# Patient Record
Sex: FEMALE | Race: WHITE
[De-identification: names, ages, dates, MRNs, and addresses within clinical notes are randomized per-mention and may not be internally consistent; named-entity substitution may affect disease eponyms.]

---

## 2017-09-24 ENCOUNTER — HEALTH MAINTENANCE LETTER (OUTPATIENT)
Age: 57
End: 2017-09-24

## 2017-11-27 ENCOUNTER — OFFICE VISIT (OUTPATIENT)
Dept: URGENT CARE | Facility: URGENT CARE | Age: 57
End: 2017-11-27
Payer: COMMERCIAL

## 2017-11-27 VITALS — OXYGEN SATURATION: 97 % | WEIGHT: 99 LBS | HEART RATE: 80 BPM | TEMPERATURE: 98.3 F

## 2017-11-27 DIAGNOSIS — M54.42 ACUTE LEFT-SIDED LOW BACK PAIN WITH LEFT-SIDED SCIATICA: Primary | ICD-10-CM

## 2017-11-27 PROCEDURE — 99203 OFFICE O/P NEW LOW 30 MIN: CPT | Performed by: PHYSICIAN ASSISTANT

## 2017-11-27 RX ORDER — NAPROXEN 500 MG/1
500 TABLET ORAL 2 TIMES DAILY PRN
Qty: 30 TABLET | Refills: 1 | Status: SHIPPED | OUTPATIENT
Start: 2017-11-27

## 2017-11-27 RX ORDER — CYCLOBENZAPRINE HCL 10 MG
10 TABLET ORAL
Qty: 14 TABLET | Refills: 1 | Status: SHIPPED | OUTPATIENT
Start: 2017-11-27

## 2017-11-27 NOTE — NURSING NOTE
Chief Complaint   Patient presents with     Back Pain     Patient complains of back pain       Initial Pulse 80  Temp 98.3  F (36.8  C) (Oral)  Wt 99 lb (44.9 kg)  SpO2 97% There is no height or weight on file to calculate BMI.  Medication Reconciliation: complete         Bette Gonsalez

## 2017-11-27 NOTE — MR AVS SNAPSHOT
"              After Visit Summary   2017    Georgina Grider    MRN: [id number]           Patient Information     Date Of Birth          1960        Visit Information        Provider Department      2017 2:50 PM Dilma Maldonado PA-C [facility]        Today's Diagnoses     Acute left-sided low back pain with left-sided sciatica    -  1       Follow-ups after your visit        Who to contact     If you have questions or need follow up information about today's clinic visit or your schedule please contact [facility] directly at [phone].  Normal or non-critical lab and imaging results will be communicated to you by Ringlyhart, letter or phone within 4 business days after the clinic has received the results. If you do not hear from us within 7 days, please contact the clinic through Ringlyhart or phone. If you have a critical or abnormal lab result, we will notify you by phone as soon as possible.  Submit refill requests through Craftsvilla or call your pharmacy and they will forward the refill request to us. Please allow 3 business days for your refill to be completed.          Additional Information About Your Visit        MyChart Information     C[employer]svilla lets you send messages to your doctor, view your test results, renew your prescriptions, schedule appointments and more. To sign up, go to [email or web address] . Click on \"Log in\" on the left side of the screen, which will take you to the Welcome page. Then click on \"Sign up Now\" on the right side of the page.     You will be asked to enter the access code listed below, as well as some personal information. Please follow the directions to create your username and password.     Your access code is: [email or web address]  Expires: 2018  3:52 PM     Your access code will  in 90 days. If you need help or a new code, please call your [facility] or [phone].        Care EveryWhere ID     This is your Care " EveryWhere ID. This could be used by other organizations to access your [facility] medical records  [id number]        Your Vitals Were     Pulse Temperature Pulse Oximetry             80 98.3  F (36.8  C) (Oral) 97%          Blood Pressure from Last 3 Encounters:   No data found for BP    Weight from Last 3 Encounters:   11/27/17 99 lb (44.9 kg)              Today, you had the following     No orders found for display         Today's Medication Changes          These changes are accurate as of: 11/27/17  3:52 PM.  If you have any questions, ask your nurse or doctor.               Start taking these medicines.        Dose/Directions    cyclobenzaprine 10 MG tablet   Commonly known as:  FLEXERIL   Used for:  Acute left-sided low back pain with left-sided sciatica   Started by:  Dilma Maldonado PA-C        Dose:  10 mg   Take 1 tablet (10 mg) by mouth nightly as needed for muscle spasms   Quantity:  14 tablet   Refills:  1       naproxen 500 MG tablet   Commonly known as:  NAPROSYN   Used for:  Acute left-sided low back pain with left-sided sciatica   Started by:  Dilma Maldonado PA-C        Dose:  500 mg   Take 1 tablet (500 mg) by mouth 2 times daily as needed for moderate pain   Quantity:  30 tablet   Refills:  1            Where to get your medicines      These medications were sent to [facility] - [place], [place] - [address]  [address], [facility] [address]     Phone:  [phone]     cyclobenzaprine 10 MG tablet    naproxen 500 MG tablet                Primary Care Provider Office Phone # Fax #    Taz Juarez [phone] [address] [address] [facility] [address]        Equal Access to Services     CHRISSIE CHU : Hadii rex Elaine, waaxda luqadaha, qaybta kaalmada grant, lula anglin. So [facility] [phone].    ATENCIÓN: Si habla español, tiene a ayala disposición servicios gratuitos de asistencia  lingüística. Farhana al [phone].    We comply with applicable federal civil rights laws and [place] laws. We do not discriminate on the basis of race, color, national origin, age, disability, sex, sexual orientation, or gender identity.            Thank you!     Thank you for choosing [facility]  for your care. Our goal is always to provide you with excellent care. Hearing back from our patients is one way we can continue to improve our services. Please take a few minutes to complete the written survey that you may receive in the mail after your visit with us. Thank you!             Your Updated Medication List - Protect others around you: Learn how to safely use, store and throw away your medicines at www.disposemymeds.org.          This list is accurate as of: 11/27/17  3:52 PM.  Always use your most recent med list.                   Brand Name Dispense Instructions for use Diagnosis    cyclobenzaprine 10 MG tablet    FLEXERIL    14 tablet    Take 1 tablet (10 mg) by mouth nightly as needed for muscle spasms    Acute left-sided low back pain with left-sided sciatica       naproxen 500 MG tablet    NAPROSYN    30 tablet    Take 1 tablet (500 mg) by mouth 2 times daily as needed for moderate pain    Acute left-sided low back pain with left-sided sciatica

## 2017-11-27 NOTE — PROGRESS NOTES
SUBJECTIVE:   Georgina Grider is a 57 year old female who presents to clinic today for the following health issues:      Back Pain      Duration: 4 days    Description (location/character/radiation): low back    Intensity:  moderate    Accompanying signs and symptoms: low back pain, pain radiating down left side of leg    History (similar episodes/previous evaluation): None    Precipitating or alleviating factors: walking, sitting    Therapies tried and outcome: aleve, ibuprofen       Onset when she twisted while pulling jeans up. Tingling pain left lateral thigh, not past knee. No bowel/bladder trouble. No fever.          No Known Allergies    No past medical history on file.      No current outpatient prescriptions on file prior to visit.  No current facility-administered medications on file prior to visit.     No past surgical history on file.    Social History     Social History     Marital status: Single     Spouse name: N/A     Number of children: N/A     Years of education: N/A     Occupational History     Not on file.     Social History Main Topics     Smoking status: Never Smoker     Smokeless tobacco: Never Used     Alcohol use Not on file     Drug use: Not on file     Sexual activity: Not on file     Other Topics Concern     Not on file     Social History Narrative     No narrative on file       REVIEW OF SYSTEMS  General: negative for fever  Resp: negative for chest pain   CV: negative for chest pain  : negative for dysuria , incontinence, frequency  Musculoskeletal: as above  Neurologic: negative for ataxia, saddle anesthesia, fecal incontinence, one sided weakness,  paresthesias    Physical Exam:  Vitals: Pulse 80  Temp 98.3  F (36.8  C) (Oral)  Wt 99 lb (44.9 kg)  SpO2 97%  BMI= There is no height or weight on file to calculate BMI.  Constitutional: healthy, alert and no acute distress   CARDIO: RRR, no MRG  RESP: lungs CTA, NAD  NEURO: Patellar reflexes intact and equal b/l  BACK:  Straight  leg raise intact, Left lumbar muscle TTP, strength intact and equal b/l lower extremities. Tender Left SI joint. FROM but more pain with extension  GAIT: intact  Psychiatric: mentation appears normal and affect normal/bright      Impression:     ICD-10-CM    1. Acute left-sided low back pain with left-sided sciatica M54.42 naproxen (NAPROSYN) 500 MG tablet     cyclobenzaprine (FLEXERIL) 10 MG tablet       Plan:Suspect left SI joint s/s. F/U PCP prn.  Instructions for back care and return precautions discussed.        Dilma Maldonado PA-C